# Patient Record
Sex: MALE | Race: WHITE | URBAN - METROPOLITAN AREA
[De-identification: names, ages, dates, MRNs, and addresses within clinical notes are randomized per-mention and may not be internally consistent; named-entity substitution may affect disease eponyms.]

---

## 2018-05-01 ENCOUNTER — APPOINTMENT (OUTPATIENT)
Dept: CT IMAGING | Facility: CLINIC | Age: 73
End: 2018-05-01
Attending: EMERGENCY MEDICINE
Payer: MEDICARE

## 2018-05-01 ENCOUNTER — HOSPITAL ENCOUNTER (OUTPATIENT)
Facility: CLINIC | Age: 73
Setting detail: OBSERVATION
Discharge: HOME | End: 2018-05-02
Attending: EMERGENCY MEDICINE | Admitting: INTERNAL MEDICINE
Payer: MEDICARE

## 2018-05-01 DIAGNOSIS — R03.0 ELEVATED BLOOD PRESSURE READING WITHOUT DIAGNOSIS OF HYPERTENSION: ICD-10-CM

## 2018-05-01 DIAGNOSIS — G45.9 TRANSIENT CEREBRAL ISCHEMIA, UNSPECIFIED TYPE: ICD-10-CM

## 2018-05-01 LAB
ANION GAP SERPL CALCULATED.3IONS-SCNC: 10 MMOL/L (ref 3–14)
BASOPHILS # BLD AUTO: 0 10E9/L (ref 0–0.2)
BASOPHILS NFR BLD AUTO: 0.3 %
BUN SERPL-MCNC: 14 MG/DL (ref 7–30)
CALCIUM SERPL-MCNC: 8.6 MG/DL (ref 8.5–10.1)
CHLORIDE SERPL-SCNC: 104 MMOL/L (ref 94–109)
CO2 SERPL-SCNC: 25 MMOL/L (ref 20–32)
CREAT BLD-MCNC: 1.1 MG/DL (ref 0.66–1.25)
CREAT SERPL-MCNC: 1.03 MG/DL (ref 0.66–1.25)
DIFFERENTIAL METHOD BLD: ABNORMAL
EOSINOPHIL # BLD AUTO: 0.1 10E9/L (ref 0–0.7)
EOSINOPHIL NFR BLD AUTO: 1.2 %
ERYTHROCYTE [DISTWIDTH] IN BLOOD BY AUTOMATED COUNT: 12.1 % (ref 10–15)
GFR SERPL CREATININE-BSD FRML MDRD: 66 ML/MIN/1.7M2
GFR SERPL CREATININE-BSD FRML MDRD: 71 ML/MIN/1.7M2
GLUCOSE SERPL-MCNC: 97 MG/DL (ref 70–99)
HCT VFR BLD AUTO: 40.7 % (ref 40–53)
HGB BLD-MCNC: 14.6 G/DL (ref 13.3–17.7)
IMM GRANULOCYTES # BLD: 0 10E9/L (ref 0–0.4)
IMM GRANULOCYTES NFR BLD: 0.3 %
INR PPP: 1.07 (ref 0.86–1.14)
INTERPRETATION ECG - MUSE: NORMAL
LYMPHOCYTES # BLD AUTO: 3.4 10E9/L (ref 0.8–5.3)
LYMPHOCYTES NFR BLD AUTO: 29.3 %
MCH RBC QN AUTO: 34.4 PG (ref 26.5–33)
MCHC RBC AUTO-ENTMCNC: 35.9 G/DL (ref 31.5–36.5)
MCV RBC AUTO: 96 FL (ref 78–100)
MONOCYTES # BLD AUTO: 0.8 10E9/L (ref 0–1.3)
MONOCYTES NFR BLD AUTO: 7.2 %
NEUTROPHILS # BLD AUTO: 7.1 10E9/L (ref 1.6–8.3)
NEUTROPHILS NFR BLD AUTO: 61.7 %
NRBC # BLD AUTO: 0 10*3/UL
NRBC BLD AUTO-RTO: 0 /100
PLATELET # BLD AUTO: 264 10E9/L (ref 150–450)
POTASSIUM SERPL-SCNC: 3.7 MMOL/L (ref 3.4–5.3)
RBC # BLD AUTO: 4.25 10E12/L (ref 4.4–5.9)
SODIUM SERPL-SCNC: 139 MMOL/L (ref 133–144)
TROPONIN I SERPL-MCNC: <0.015 UG/L (ref 0–0.04)
WBC # BLD AUTO: 11.5 10E9/L (ref 4–11)

## 2018-05-01 PROCEDURE — 85610 PROTHROMBIN TIME: CPT | Performed by: EMERGENCY MEDICINE

## 2018-05-01 PROCEDURE — 99220 ZZC INITIAL OBSERVATION CARE,LEVL III: CPT | Performed by: INTERNAL MEDICINE

## 2018-05-01 PROCEDURE — 70450 CT HEAD/BRAIN W/O DYE: CPT | Mod: XS

## 2018-05-01 PROCEDURE — 84484 ASSAY OF TROPONIN QUANT: CPT | Performed by: EMERGENCY MEDICINE

## 2018-05-01 PROCEDURE — 82565 ASSAY OF CREATININE: CPT | Mod: 91

## 2018-05-01 PROCEDURE — 70496 CT ANGIOGRAPHY HEAD: CPT

## 2018-05-01 PROCEDURE — 93005 ELECTROCARDIOGRAM TRACING: CPT

## 2018-05-01 PROCEDURE — G0378 HOSPITAL OBSERVATION PER HR: HCPCS

## 2018-05-01 PROCEDURE — 99285 EMERGENCY DEPT VISIT HI MDM: CPT | Mod: 25

## 2018-05-01 PROCEDURE — 80048 BASIC METABOLIC PNL TOTAL CA: CPT | Performed by: EMERGENCY MEDICINE

## 2018-05-01 PROCEDURE — 25000125 ZZHC RX 250: Performed by: EMERGENCY MEDICINE

## 2018-05-01 PROCEDURE — 85025 COMPLETE CBC W/AUTO DIFF WBC: CPT | Performed by: EMERGENCY MEDICINE

## 2018-05-01 PROCEDURE — 25000128 H RX IP 250 OP 636: Performed by: EMERGENCY MEDICINE

## 2018-05-01 RX ORDER — AMOXICILLIN 250 MG
1 CAPSULE ORAL 2 TIMES DAILY PRN
Status: DISCONTINUED | OUTPATIENT
Start: 2018-05-01 | End: 2018-05-02 | Stop reason: HOSPADM

## 2018-05-01 RX ORDER — AMOXICILLIN 250 MG
2 CAPSULE ORAL 2 TIMES DAILY PRN
Status: DISCONTINUED | OUTPATIENT
Start: 2018-05-01 | End: 2018-05-02 | Stop reason: HOSPADM

## 2018-05-01 RX ORDER — LABETALOL HYDROCHLORIDE 5 MG/ML
10-40 INJECTION, SOLUTION INTRAVENOUS EVERY 10 MIN PRN
Status: DISCONTINUED | OUTPATIENT
Start: 2018-05-01 | End: 2018-05-02 | Stop reason: HOSPADM

## 2018-05-01 RX ORDER — ATORVASTATIN CALCIUM 40 MG/1
40 TABLET, FILM COATED ORAL
Status: DISCONTINUED | OUTPATIENT
Start: 2018-05-02 | End: 2018-05-02 | Stop reason: HOSPADM

## 2018-05-01 RX ORDER — SODIUM CHLORIDE 9 MG/ML
INJECTION, SOLUTION INTRAVENOUS CONTINUOUS
Status: DISCONTINUED | OUTPATIENT
Start: 2018-05-02 | End: 2018-05-02

## 2018-05-01 RX ORDER — ACETAMINOPHEN 325 MG/1
650 TABLET ORAL EVERY 4 HOURS PRN
Status: DISCONTINUED | OUTPATIENT
Start: 2018-05-01 | End: 2018-05-02 | Stop reason: HOSPADM

## 2018-05-01 RX ORDER — NALOXONE HYDROCHLORIDE 0.4 MG/ML
.1-.4 INJECTION, SOLUTION INTRAMUSCULAR; INTRAVENOUS; SUBCUTANEOUS
Status: DISCONTINUED | OUTPATIENT
Start: 2018-05-01 | End: 2018-05-02 | Stop reason: HOSPADM

## 2018-05-01 RX ORDER — IOPAMIDOL 755 MG/ML
70 INJECTION, SOLUTION INTRAVASCULAR ONCE
Status: COMPLETED | OUTPATIENT
Start: 2018-05-01 | End: 2018-05-01

## 2018-05-01 RX ORDER — BISACODYL 10 MG
10 SUPPOSITORY, RECTAL RECTAL DAILY PRN
Status: DISCONTINUED | OUTPATIENT
Start: 2018-05-01 | End: 2018-05-02 | Stop reason: HOSPADM

## 2018-05-01 RX ORDER — HYDRALAZINE HYDROCHLORIDE 20 MG/ML
10-20 INJECTION INTRAMUSCULAR; INTRAVENOUS
Status: DISCONTINUED | OUTPATIENT
Start: 2018-05-01 | End: 2018-05-02 | Stop reason: HOSPADM

## 2018-05-01 RX ORDER — POLYETHYLENE GLYCOL 3350 17 G/17G
17 POWDER, FOR SOLUTION ORAL DAILY PRN
Status: DISCONTINUED | OUTPATIENT
Start: 2018-05-01 | End: 2018-05-02 | Stop reason: HOSPADM

## 2018-05-01 RX ORDER — PROCHLORPERAZINE 25 MG
12.5 SUPPOSITORY, RECTAL RECTAL EVERY 12 HOURS PRN
Status: DISCONTINUED | OUTPATIENT
Start: 2018-05-01 | End: 2018-05-02 | Stop reason: HOSPADM

## 2018-05-01 RX ORDER — ACETAMINOPHEN 650 MG/1
650 SUPPOSITORY RECTAL EVERY 4 HOURS PRN
Status: DISCONTINUED | OUTPATIENT
Start: 2018-05-01 | End: 2018-05-02 | Stop reason: HOSPADM

## 2018-05-01 RX ORDER — PROCHLORPERAZINE MALEATE 5 MG
5 TABLET ORAL EVERY 6 HOURS PRN
Status: DISCONTINUED | OUTPATIENT
Start: 2018-05-01 | End: 2018-05-02 | Stop reason: HOSPADM

## 2018-05-01 RX ORDER — LIDOCAINE 40 MG/G
CREAM TOPICAL
Status: DISCONTINUED | OUTPATIENT
Start: 2018-05-01 | End: 2018-05-02 | Stop reason: HOSPADM

## 2018-05-01 RX ORDER — ONDANSETRON 2 MG/ML
4 INJECTION INTRAMUSCULAR; INTRAVENOUS EVERY 6 HOURS PRN
Status: DISCONTINUED | OUTPATIENT
Start: 2018-05-01 | End: 2018-05-02 | Stop reason: HOSPADM

## 2018-05-01 RX ORDER — ASPIRIN 300 MG/1
300 SUPPOSITORY RECTAL DAILY
Status: DISCONTINUED | OUTPATIENT
Start: 2018-05-02 | End: 2018-05-02

## 2018-05-01 RX ORDER — ONDANSETRON 4 MG/1
4 TABLET, ORALLY DISINTEGRATING ORAL EVERY 6 HOURS PRN
Status: DISCONTINUED | OUTPATIENT
Start: 2018-05-01 | End: 2018-05-02 | Stop reason: HOSPADM

## 2018-05-01 RX ADMIN — IOPAMIDOL 70 ML: 755 INJECTION, SOLUTION INTRAVENOUS at 20:48

## 2018-05-01 RX ADMIN — SODIUM CHLORIDE 90 ML: 9 INJECTION, SOLUTION INTRAVENOUS at 20:49

## 2018-05-01 ASSESSMENT — ENCOUNTER SYMPTOMS
SPEECH DIFFICULTY: 1
NECK PAIN: 0
WEAKNESS: 0
HEADACHES: 1

## 2018-05-01 ASSESSMENT — VISUAL ACUITY: OU: NORMAL ACUITY

## 2018-05-01 NOTE — IP AVS SNAPSHOT
[address], [address]    [facility] [address]    Phone:  [phone]                                       After Visit Summary   5/1/2018    Bucky Cobb    MRN: [id number]           After Visit Summary Signature Page     I have received my discharge instructions, and my questions have been answered. I have discussed any challenges I see with this plan with the nurse or doctor.    ..........................................................................................................................................  Patient/Patient Representative Signature      ..........................................................................................................................................  Patient Representative Print Name and Relationship to Patient    ..................................................               ................................................  Date                                            Time    ..........................................................................................................................................  Reviewed by Signature/Title    ...................................................              ..............................................  Date                                                            Time

## 2018-05-01 NOTE — IP AVS SNAPSHOT
MRN:[id number]                      After Visit Summary   5/1/2018    Bucky Cobb    MRN: [id number]           Thank you!     Thank you for choosing [facility] for your care. Our goal is always to provide you with excellent care. Hearing back from our patients is one way we can continue to improve our services. Please take a few minutes to complete the written survey that you may receive in the mail after you visit with us. Thank you!        Patient Information     Date Of Birth          1945        Designated Caregiver       Most Recent Value    Caregiver    Will someone help with your care after discharge? yes    Name of designated caregiver nataliia     Phone number of caregiver [phone]    Caregiver address [address]      About your hospital stay     You were admitted on:  May 1, 2018 You last received care in the:  [facility] [facility] [address] [facility]    You were discharged on:  May 2, 2018        Reason for your hospital stay       TIA                  Who to Call     For medical emergencies, please call 911.  For non-urgent questions about your medical care, please call your primary care provider or clinic, None          Attending Provider     Provider Specialty    Cate Michelle MD Emergency Medicine    Lozano, Lane Gomez MD Internal Medicine       Primary Care Provider Fax #    Physician No Ref-Primary [phone]      After Care Instructions     Activity       Your activity upon discharge: activity as tolerated            Diet       Follow this diet upon discharge: Orders Placed This Encounter      Regular Diet Adult                  Follow-up Appointments     Follow-up and recommended labs and tests        Follow up with primary care provider, Physician No Ref-Primary, within 7-10 days to evaluate medication change and for hospital follow- up.  No follow up labs or test are needed.      Follow up with neurology in 4 weeks.                  Future tests that  "were ordered for you     Zio Patch Holter                 Pending Results     No orders found for last 3 day(s).            Statement of Approval     Ordered          [id number]  I have reviewed and agree with all the recommendations and orders detailed in this document.  EFFECTIVE NOW     Approved and electronically signed by:  Margaret Pisano MD             Admission Information     Date & Time Provider Department Dept. Phone    2018 Lozano, Lane Gomez MD [place] [address] [facility] [phone]      Your Vitals Were     Blood Pressure Pulse Temperature Respirations Height Weight    133/74 (BP Location: Right arm) 71 97.4  F (36.3  C) (Oral) 16 1.753 m (5' 9\") 87.5 kg (193 lb)    Pulse Oximetry BMI (Body Mass Index)                99% 28.5 kg/m2          MyChart Information     [facility] lets you send messages to your doctor, view your test results, renew your prescriptions, schedule appointments and more. To sign up, go to [email or web address] . Click on \"Log in\" on the left side of the screen, which will take you to the Welcome page. Then click on \"Sign up Now\" on the right side of the page.     You will be asked to enter the access code listed below, as well as some personal information. Please follow the directions to create your username and password.     Your access code is: [email or web address]  Expires: 2018  5:19 PM     Your access code will  in 90 days. If you need help or a new code, please call your [facility] clinic or [phone].        Care EveryWhere ID     This is your Care EveryWhere ID. This could be used by other organizations to access your [facility] medical records  [id number]        Equal Access to Services     [facility] AH: Hadii darling Tang, waangelada luneshaadaha, qaybta kaalmada adiel, sahil wilkes. So [facility] [phone].    ATENCIÓN: Si habla español, tiene a cordero disposición servicios gratuitos de asistencia lingüística. Llame al " [phone].    We comply with applicable federal civil rights laws and [place] laws. We do not discriminate on the basis of race, color, national origin, age, disability, sex, sexual orientation, or gender identity.               Review of your medicines      START taking        Dose / Directions    aspirin 81 MG EC tablet   Used for:  Transient cerebral ischemia, unspecified type        Dose:  81 mg   Start taking on:  5/3/2018   Take 1 tablet (81 mg) by mouth daily   Quantity:  30 tablet   Refills:  3       atorvastatin 40 MG tablet   Commonly known as:  LIPITOR   Used for:  Transient cerebral ischemia, unspecified type        Dose:  40 mg   1 tablet (40 mg) by Oral or NG Tube route daily   Quantity:  30 tablet   Refills:  0            Where to get your medicines      These medications were sent to [facility] - [address]  [address], [place] [address]     Phone:  [phone]     aspirin 81 MG EC tablet    atorvastatin 40 MG tablet                Protect others around you: Learn how to safely use, store and throw away your medicines at www.disposemymeds.org.             Medication List: This is a list of all your medications and when to take them. Check marks below indicate your daily home schedule. Keep this list as a reference.      Medications           Morning Afternoon Evening Bedtime As Needed    aspirin 81 MG EC tablet   Take 1 tablet (81 mg) by mouth daily   Start taking on:  5/3/2018   Last time this was given:  325 mg on 5/2/2018  8:04 AM                                   atorvastatin 40 MG tablet   Commonly known as:  LIPITOR   1 tablet (40 mg) by Oral or NG Tube route daily   Next Dose Due:  5/2/18 6:00 PM

## 2018-05-02 ENCOUNTER — APPOINTMENT (OUTPATIENT)
Dept: MRI IMAGING | Facility: CLINIC | Age: 73
End: 2018-05-02
Payer: MEDICARE

## 2018-05-02 ENCOUNTER — APPOINTMENT (OUTPATIENT)
Dept: MRI IMAGING | Facility: CLINIC | Age: 73
End: 2018-05-02
Attending: INTERNAL MEDICINE
Payer: MEDICARE

## 2018-05-02 ENCOUNTER — APPOINTMENT (OUTPATIENT)
Dept: CARDIOLOGY | Facility: CLINIC | Age: 73
End: 2018-05-02
Attending: INTERNAL MEDICINE
Payer: MEDICARE

## 2018-05-02 VITALS
SYSTOLIC BLOOD PRESSURE: 133 MMHG | BODY MASS INDEX: 28.58 KG/M2 | HEART RATE: 71 BPM | HEIGHT: 69 IN | RESPIRATION RATE: 16 BRPM | DIASTOLIC BLOOD PRESSURE: 74 MMHG | OXYGEN SATURATION: 99 % | TEMPERATURE: 97.4 F | WEIGHT: 193 LBS

## 2018-05-02 LAB
ALBUMIN SERPL-MCNC: 3.7 G/DL (ref 3.4–5)
ALP SERPL-CCNC: 53 U/L (ref 40–150)
ALT SERPL W P-5'-P-CCNC: 20 U/L (ref 0–70)
AST SERPL W P-5'-P-CCNC: 11 U/L (ref 0–45)
BILIRUB DIRECT SERPL-MCNC: 0.1 MG/DL (ref 0–0.2)
BILIRUB SERPL-MCNC: 0.6 MG/DL (ref 0.2–1.3)
CHOLEST SERPL-MCNC: 177 MG/DL
GLUCOSE BLDC GLUCOMTR-MCNC: 93 MG/DL (ref 70–99)
HBA1C MFR BLD: 5.6 % (ref 0–6.4)
HDLC SERPL-MCNC: 63 MG/DL
LDLC SERPL CALC-MCNC: 91 MG/DL
NONHDLC SERPL-MCNC: 114 MG/DL
PROT SERPL-MCNC: 7.6 G/DL (ref 6.8–8.8)
TRIGL SERPL-MCNC: 114 MG/DL
TROPONIN I SERPL-MCNC: <0.015 UG/L (ref 0–0.04)
TROPONIN I SERPL-MCNC: <0.015 UG/L (ref 0–0.04)
TSH SERPL DL<=0.005 MIU/L-ACNC: 2.74 MU/L (ref 0.4–4)

## 2018-05-02 PROCEDURE — 99217 ZZC OBSERVATION CARE DISCHARGE: CPT | Performed by: HOSPITALIST

## 2018-05-02 PROCEDURE — 95816 EEG AWAKE AND DROWSY: CPT

## 2018-05-02 PROCEDURE — 84443 ASSAY THYROID STIM HORMONE: CPT | Performed by: INTERNAL MEDICINE

## 2018-05-02 PROCEDURE — 83036 HEMOGLOBIN GLYCOSYLATED A1C: CPT | Performed by: INTERNAL MEDICINE

## 2018-05-02 PROCEDURE — 25000128 H RX IP 250 OP 636: Performed by: INTERNAL MEDICINE

## 2018-05-02 PROCEDURE — 70553 MRI BRAIN STEM W/O & W/DYE: CPT | Mod: 77

## 2018-05-02 PROCEDURE — 80076 HEPATIC FUNCTION PANEL: CPT | Performed by: INTERNAL MEDICINE

## 2018-05-02 PROCEDURE — A9270 NON-COVERED ITEM OR SERVICE: HCPCS | Mod: GY | Performed by: INTERNAL MEDICINE

## 2018-05-02 PROCEDURE — 93306 TTE W/DOPPLER COMPLETE: CPT

## 2018-05-02 PROCEDURE — G0378 HOSPITAL OBSERVATION PER HR: HCPCS

## 2018-05-02 PROCEDURE — 80061 LIPID PANEL: CPT | Performed by: INTERNAL MEDICINE

## 2018-05-02 PROCEDURE — 93306 TTE W/DOPPLER COMPLETE: CPT | Mod: 26 | Performed by: INTERNAL MEDICINE

## 2018-05-02 PROCEDURE — A9585 GADOBUTROL INJECTION: HCPCS | Performed by: INTERNAL MEDICINE

## 2018-05-02 PROCEDURE — 40000061 ZZH STATISTIC EEG TIME EA 10 MIN

## 2018-05-02 PROCEDURE — 70553 MRI BRAIN STEM W/O & W/DYE: CPT

## 2018-05-02 PROCEDURE — 84484 ASSAY OF TROPONIN QUANT: CPT | Performed by: INTERNAL MEDICINE

## 2018-05-02 PROCEDURE — 25000132 ZZH RX MED GY IP 250 OP 250 PS 637: Mod: GY | Performed by: INTERNAL MEDICINE

## 2018-05-02 PROCEDURE — 36415 COLL VENOUS BLD VENIPUNCTURE: CPT | Performed by: INTERNAL MEDICINE

## 2018-05-02 PROCEDURE — 70546 MR ANGIOGRAPH HEAD W/O&W/DYE: CPT | Mod: XS

## 2018-05-02 PROCEDURE — 00000146 ZZHCL STATISTIC GLUCOSE BY METER IP

## 2018-05-02 RX ORDER — GADOBUTROL 604.72 MG/ML
10 INJECTION INTRAVENOUS ONCE
Status: COMPLETED | OUTPATIENT
Start: 2018-05-02 | End: 2018-05-02

## 2018-05-02 RX ORDER — ATORVASTATIN CALCIUM 40 MG/1
40 TABLET, FILM COATED ORAL DAILY
Qty: 30 TABLET | Refills: 0 | Status: SHIPPED | OUTPATIENT
Start: 2018-05-02

## 2018-05-02 RX ORDER — ASPIRIN 81 MG/1
81 TABLET ORAL DAILY
Status: DISCONTINUED | OUTPATIENT
Start: 2018-05-03 | End: 2018-05-02 | Stop reason: HOSPADM

## 2018-05-02 RX ORDER — GADOBUTROL 604.72 MG/ML
9 INJECTION INTRAVENOUS ONCE
Status: COMPLETED | OUTPATIENT
Start: 2018-05-02 | End: 2018-05-02

## 2018-05-02 RX ORDER — ASPIRIN 300 MG/1
300 SUPPOSITORY RECTAL DAILY
Status: DISCONTINUED | OUTPATIENT
Start: 2018-05-03 | End: 2018-05-02 | Stop reason: HOSPADM

## 2018-05-02 RX ADMIN — ASPIRIN 325 MG: 325 TABLET, DELAYED RELEASE ORAL at 08:04

## 2018-05-02 RX ADMIN — GADOBUTROL 9 ML: 604.72 INJECTION INTRAVENOUS at 05:16

## 2018-05-02 RX ADMIN — ASPIRIN 325 MG: 325 TABLET, DELAYED RELEASE ORAL at 01:01

## 2018-05-02 RX ADMIN — SODIUM CHLORIDE, PRESERVATIVE FREE: 5 INJECTION INTRAVENOUS at 00:24

## 2018-05-02 RX ADMIN — GADOBUTROL 10 ML: 604.72 INJECTION INTRAVENOUS at 15:05

## 2018-05-02 ASSESSMENT — VISUAL ACUITY
OU: NORMAL ACUITY

## 2018-05-02 NOTE — H&P
[facility]    History and Physical  Hospitalist       Date of Admission:  5/1/2018    Assessment & Plan   Bucky Cobb is a 72 year old male who presents with transient episode of garbled speech concerning for TIA    Garbled speech: Likely transient ischemic event.  Head CT and CTA negative, MRI pending.  Approximately 10 minutes of symptoms suggestive of TIA.  No associated weakness.  Patient denies any prior medical diagnoses, does not take any medication.  Does not smoke or drink.  No family history of cardiac or cerebrovascular disease.  -Bedside swallow evaluation per nursing protocol  -Cardiac telemetry to rule out/monitor for dysrhythmia  -MRI brain pending  -TTE with bubble study  -Neurology consult for suspected TIA  -Atorvastatin 40 mg daily.  Patient with some hesitancy regarding starting statin, though agreeable to initiation for immediate anti-inflammatory benefit.  Discussed potential for discontinuing at a later date versus transition over to red rice yeast as is patient's preference.  -Daily p.o./DE aspirin  -Discussed with patient that I will likely recommend continuation of both aspirin and statin therapy at discharge.  -At this time, physical therapy, occupational therapy, and speech pathology have not been consulted.  If MRI demonstrates ischemic event, these will need to be ordered.  -Troponin, inflammatory markers, hemoglobin A1c pending    # Pain Assessment:  Current Pain Score 5/1/2018   Pain score (0-10) 2   Bucky s pain level was assessed and he currently denies pain.  Was having mild discomfort with left-sided headache in emergency department.  Acetaminophen p.o./DE is available for pain control with anticipated initiation of aspirin therapy.    DVT Prophylaxis: Pneumatic Compression Devices    Code Status: DNR. Okay with a time limited trial of intubation for reversible causes. Discussed with patient and wife on admisson.    Disposition: Expected discharge 5/2/18  pending neurology evaluation, MRI results.    [facility]    Primary Care Physician   Dr Merlin Brown    Chief Complaint   Garbled speech    History is obtained from the patient, chart review, discussion with Dr Michelle in the ED, discussion with patient's wife at bedside    History of Present Illness   Bucky Cobb is a 72 year old male who presents after an episode of garbled speech occurring at approximately 2PM, lasting approximately 10 minutes. Associated with a left sided headache. No weakness, no visual symptoms.     Patient describes ambulating at [facility] earlier today when he had onset of left-sided headache at approximately 2 PM.  States that he typically does not get headaches.  Attributed onset of headache to possible chemical exposure at [facility] (no specific exposure noted).  While explaining this to his wife, however, he noted that his speech was garbled.  Per wife and patient's description/reenactment, rearrangement of syllables with unintelligible words.  Patient's wife stated that she actually tested his  strength and look for facial droop as they got into the car, and patient had none.  Patient noted no weakness in any of his extremities associated with onset of symptoms.  Has never had similar symptoms in the past.  No prior history of migraine headaches or atypical migraines.  Patient went home, took a nap, and when he woke, symptoms had resolved.  States that he talked to his daughter, who is graduating from medical school in [place] to study neurology later this year, and she encouraged him to come to the emergency department for evaluation given fear of stroke versus TIA.  At this time, patient is asymptomatic.  Speech is clear and intelligible, no focal deficits.    Patient describes himself as otherwise quite healthy.  He takes no medications and states that he has no prior medical diagnoses.  States that his laboratory studies were evaluated in August and reportedly excellent.   States that his typical blood pressure is in the 120 systolic range, though currently is in the 160s.  States that he has been under quite a bit of stress over the past several days.  Patient's mother-in-law has dementia, and he spent 8 of the last 10 or so days driving to and from [place] to pick her up and bring her back to [place] so that she could be placed in a memory care unit.  Patient and his wife state that she can be quite disagreeable with her dementia, and this had been a high stress trip.     Patient states that he typically walks 5 or so miles per day, though has not done so for the past several months.  Describes becoming ill with the flu last summer while in [place], subsequently having a torn quadriceps leading to a fairly prolonged period of immobility.  Subsequently minimally active over the winter months given the weather.    Patient does not smoke, does not drink alcohol.    Past Medical History    I have reviewed this patient's medical history and updated it with pertinent information if needed.   History reviewed. No pertinent past medical history. Denies any medical diagnoses including HTN, HLD, diabetes. No heart disease or CVA history.    Past Surgical History   I have reviewed this patient's surgical history and updated it with pertinent information if needed.  Patient with no pertinent surgical history, though did have a quadriceps tear in August resulting in decreased mobility.    Prior to Admission Medications   None     Allergies   No Known Allergies    Social History   I have reviewed this patient's social history and updated it with pertinent information if needed. Bucky Cobb  reports that he has never smoked. He has never used smokeless tobacco. does not use illicit drugs    Family History   I have reviewed this patient's family history and updated it with pertinent information if needed.   Mother  at age 85, father  at age 95. No history of heart disease or stroke  in family  Sister drinks alcohol and smokes cigarettes, though is reportedly healthy.    Review of Systems   The 10 point Review of Systems is negative other than noted in the HPI or here.  No lower extremity swelling or pain in legs associated with prolonged car travel  No extremity weakness    Physical Exam   Temp: 98.1  F (36.7  C) Temp src: Oral BP: 157/90 Pulse: 77 Heart Rate: 74 Resp: 20 SpO2: 98 %      Vital Signs with Ranges  Temp:  [98.1  F (36.7  C)] 98.1  F (36.7  C)  Pulse:  [77] 77  Heart Rate:  [71-74] 74  Resp:  [20] 20  BP: (146-165)/(85-95) 157/90  SpO2:  [97 %-98 %] 98 %  193 lbs 0 oz    Constitutional: no acute distress, alert, conversant male laying comfortably in bed.  Eyes: no scleral icterus or injection  HEENT: moist mucous membranes.  Capillary bed collapse is noted in bilateral earlobes.  Respiratory: breath sounds clear bilaterally to auscultation, no wheezes, no crackles  Cardiovascular: regular rate and rhythm, no murmur   GI: abdomen soft, non-tender, normoactive bowel sounds, no masses  Lymph/Hematologic: no lower extremity swelling  Musculoskeletal: muscular tone intact in all extremities  Neurologic: mental status grossly intact, no focal deficits, alert  Psychiatric: normal affect, jovial.    Data   Data reviewed today:  I personally reviewed the EKG tracing showing Normal sinus rhythm.    Recent Labs  Lab 05/01/18 2007   WBC 11.5*   HGB 14.6   MCV 96      INR 1.07      POTASSIUM 3.7   CHLORIDE 104   CO2 25   BUN 14   CR 1.03   ANIONGAP 10   LIANA 8.6   GLC 97   TROPI <0.015       Recent Results (from the past 24 hour(s))   CT Head w/o Contrast    Narrative    CT SCAN OF THE HEAD WITHOUT CONTRAST   5/1/2018 9:00 PM     HISTORY: Headache and transient speech change, both resolved.    TECHNIQUE: Axial images of the head and coronal reformations without  IV contrast material. Radiation dose for this scan was reduced using  automated exposure control, adjustment of the mA  and/or kV according  to patient size, or iterative reconstruction technique.    COMPARISON: None.    FINDINGS: There is no evidence of intracranial hemorrhage, mass, or  anomaly. The ventricles are normal in size, shape and configuration.  Mild diffuse parenchymal volume loss. Mild patchy periventricular  white matter hypodensities which are nonspecific, but likely related  to chronic microvascular ischemic disease.     The visualized portions of the sinuses and mastoids appear normal. The  bony calvarium and bones of the skull base appear intact.       Impression    IMPRESSION:     1. No evidence of acute intracranial hemorrhage, mass, or herniation.  2. Mild diffuse parenchymal volume loss and white matter changes  likely due to chronic microvascular ischemic disease.      MADISON ALICEA MD   CT Head Neck Angio w/o & w Contrast    Narrative    CT ANGIOGRAM OF THE HEAD AND NECK WITH CONTRAST  5/1/2018 9:00 PM     HISTORY: Headache, transient speech change, now resolved.     TECHNIQUE: CT angiography with an injection of 70 mL Isovue -370 IV  with scans through the head and neck.  Images were transferred to a  separate 3-D workstation where multiplanar reformations and 3-D images  were created.  Estimates of carotid stenoses are made relative to the  distal internal carotid artery diameters except as noted. Radiation  dose for this scan was reduced using automated exposure control,  adjustment of the mA and/or kV according to patient size, or iterative  reconstruction technique.    COMPARISON: None.     CT HEAD FINDINGS: No contrast enhancing lesions. Cerebral blood flow  is grossly normal.     CT ANGIOGRAM HEAD FINDINGS:  The major intracranial arteries including  the proximal branches of the anterior cerebral, middle cerebral, and  posterior cerebral arteries appear patent without vascular cutoff. No  aneurysm identified. No significant stenosis.     Anatomic variant of a small or absent left P1 segment with a  patent  left posterior communicating artery.     CT ANGIOGRAM NECK FINDINGS:   Normal origin of the great vessels from the aortic arch.     Right carotid artery: The right common and internal carotid arteries  are patent. No significant stenosis.     Left carotid artery: The left common and internal carotid arteries are  patent. Mild atherosclerotic disease at the carotid bifurcation  without stenosis.     Vertebral arteries: Vertebral arteries are patent without evidence of  dissection. No significant stenosis.     Other findings: None.       Impression    IMPRESSION:   1. Patent arteries in the neck without evidence of dissection. Mild  atherosclerotic disease at the left carotid bifurcation without  significant stenosis by NASCET criteria.  2. Patent proximal major intracranial arteries without vascular  cutoff. No significant stenosis. No aneurysm identified.

## 2018-05-02 NOTE — PLAN OF CARE
Problem: Patient Care Overview  Goal: Plan of Care/Patient Progress Review  Outcome: No Change  Patient A/Ox4. VSS on RA. Patient denies pain, SOB, nausea, numbness/tingling. Neuro's intact. Passed bedside swallow screen. NIH complete. Advanced diet to regular. Up SBA. BG 93. Tele: NSR. IVF nacl 100ml/h. Voiding adequately. LUE slight tremor that comes and goes baseline per patient.Plan; MRI results pending, echo today, neuro consult today.      Observation goals PRIOR TO DISCHARGE     Comments: -diagnostic tests and consults completed and resulted  Not met  -vital signs normal or at patient baseline Met  Nurse to notify provider when observation goals have been met and patient is ready for discharge.

## 2018-05-02 NOTE — CONSULTS
Neurology Stroke Consult    Bucky Cobb  [id number]  1945    [facility]  Mr. Cobb is a 72-year-old gentleman with past medical history as described below who presented with an episode of expressive aphasia lasting for several minutes that was witnessed by his wife.  He remembers the episode and he remembers talking with his wife however he was not able to find correct words.  He denied of any other focal neurological deficits.  He did describe mild headache however otherwise he remained normal.  The event occurred in setting of him trying to lift up heavy luggage that he was trying to put the back of his car.  He denies of any similar episodes in the past.      Pertinent Past Medical/Surgical History    Active Ambulatory Problems     Diagnosis Date Noted     No Active Ambulatory Problems     Resolved Ambulatory Problems     Diagnosis Date Noted     No Resolved Ambulatory Problems     No Additional Past Medical History         Medications:   No prescriptions prior to admission.   .    Allergies: No Known Allergies.    Family History: No family history on file..    Social History:   Social History   Substance Use Topics     Smoking status: Never Smoker     Smokeless tobacco: Never Used     Alcohol use Not on file   .      ROS:  The 10 point Review of Systems is negative other than noted in the HPI or here.     PHYSICAL EXAMINATION  Vital Signs:  B/P: 111/67,  T: 97.9,  P: Data Unavailable,  R: 24    General:  patient lying in bed without any acute distress    HEENT:  normocephalic/atraumatic  Cardio:  RRR  Pulmonary:  no respiratory distress  Abdomen:  soft  Extremities:  no edema  Skin:  intact     Neurologic  Mental Status:  fully alert, attentive and oriented, follows commands, speech clear and fluent  Cranial Nerves:  visual fields intact, PERRL, EOMI with normal smooth pursuit, facial sensation intact and symmetric, facial movements symmetric, hearing not formally tested but intact to conversation, palate  elevation symmetric and uvula midline, no dysarthria, shoulder shrug strong bilaterally, tongue protrusion midline  Motor:  no abnormal movements, normal tone throughout, normal muscle bulk, no pronator drift, normal and symmetric rapid finger tapping, able to move all limbs spontaneously, strength 5/5 throughout upper and lower extremities  Reflexes:  2+ and symmetric throughout, no clonus, toes downgoing  Sensory:  intact/symmetric to light touch and pin prick throughout upper and lower extremities  Coordination:  FNF and HS intact without dysmetria  Station/Gait:  unable to test    Labs  Most Recent 3 CBC's:  Recent Labs   Lab Test  05/01/18 2007   WBC  11.5*   HGB  14.6   MCV  96   PLT  264      Most Recent 3 BMP's:  Recent Labs   Lab Test  05/01/18 2007   NA  139   POTASSIUM  3.7   CHLORIDE  104   CO2  25   BUN  14   CR  1.03   ANIONGAP  10   LIANA  8.6   GLC  97     Most Recent 2 LFT's:  Recent Labs   Lab Test  05/02/18   0025   AST  11   ALT  20   ALKPHOS  53   BILITOTAL  0.6     Most Recent INR's and Anticoagulation Dosing History:  Anticoagulation Dose History     Recent Dosing and Labs Latest Ref Rng & Units 5/1/2018    INR 0.86 - 1.14 1.07        Most Recent 3 Troponin's:  Recent Labs   Lab Test  05/02/18   0830  05/02/18   0025  05/01/18 2007   TROPI  <0.015  <0.015  <0.015     Most Recent Cholesterol Panel:  Recent Labs   Lab Test  05/02/18   0025   CHOL  177   LDL  91   HDL  63   TRIG  114     Most Recent 6 Bacteria Isolates From Any Culture (See EPIC Reports for Culture Details):No lab results found.  Most Recent TSH, T4 and A1c Labs:  Recent Labs   Lab Test  05/02/18   0025   TSH  2.74   A1C  5.6     Results for orders placed or performed during the hospital encounter of 05/01/18   CT Head w/o Contrast    Narrative    CT SCAN OF THE HEAD WITHOUT CONTRAST   5/1/2018 9:00 PM     HISTORY: Headache and transient speech change, both resolved.    TECHNIQUE: Axial images of the head and coronal  reformations without  IV contrast material. Radiation dose for this scan was reduced using  automated exposure control, adjustment of the mA and/or kV according  to patient size, or iterative reconstruction technique.    COMPARISON: None.    FINDINGS: There is no evidence of intracranial hemorrhage, mass, or  anomaly. The ventricles are normal in size, shape and configuration.  Mild diffuse parenchymal volume loss. Mild patchy periventricular  white matter hypodensities which are nonspecific, but likely related  to chronic microvascular ischemic disease.     The visualized portions of the sinuses and mastoids appear normal. The  bony calvarium and bones of the skull base appear intact.       Impression    IMPRESSION:     1. No evidence of acute intracranial hemorrhage, mass, or herniation.  2. Mild diffuse parenchymal volume loss and white matter changes  likely due to chronic microvascular ischemic disease.      MADISON ALICEA MD   CT Head Neck Angio w/o & w Contrast    Narrative    CT ANGIOGRAM OF THE HEAD AND NECK WITH CONTRAST  5/1/2018 9:00 PM     HISTORY: Headache, transient speech change, now resolved.     TECHNIQUE: CT angiography with an injection of 70 mL Isovue -370 IV  with scans through the head and neck.  Images were transferred to a  separate 3-D workstation where multiplanar reformations and 3-D images  were created.  Estimates of carotid stenoses are made relative to the  distal internal carotid artery diameters except as noted. Radiation  dose for this scan was reduced using automated exposure control,  adjustment of the mA and/or kV according to patient size, or iterative  reconstruction technique.    COMPARISON: None.     CT HEAD FINDINGS: No contrast enhancing lesions. Cerebral blood flow  is grossly normal.     CT ANGIOGRAM HEAD FINDINGS:  The major intracranial arteries including  the proximal branches of the anterior cerebral, middle cerebral, and  posterior cerebral arteries appear patent  without vascular cutoff. No  aneurysm identified. No significant stenosis.     Anatomic variant of a small or absent left P1 segment with a patent  left posterior communicating artery.     CT ANGIOGRAM NECK FINDINGS:   Normal origin of the great vessels from the aortic arch.     Right carotid artery: The right common and internal carotid arteries  are patent. No significant stenosis.     Left carotid artery: The left common and internal carotid arteries are  patent. Mild atherosclerotic disease at the carotid bifurcation  without stenosis.     Vertebral arteries: Vertebral arteries are patent without evidence of  dissection. No significant stenosis.     Other findings: None.       Impression    IMPRESSION:   1. Patent arteries in the neck without evidence of dissection. Mild  atherosclerotic disease at the left carotid bifurcation without  significant stenosis by NASCET criteria.  2. Patent proximal major intracranial arteries without vascular  cutoff. No significant stenosis. No aneurysm identified.       MADISON ALICEA MD   MRI Brain w & w/o contrast    Narrative    MRI OF THE BRAIN WITHOUT AND WITH CONTRAST 5/2/2018 5:17 AM     COMPARISON: Head CT 5/1/2018.    HISTORY:  Stroke with SWI Sequence.     TECHNIQUE: Axial diffusion-weighted with ADC map, axial T2-weighted  with fat saturation, axial T1-weighted, axial turboFLAIR and coronal  T1-weighted images of the brain were acquired without intravenous  contrast.  Following intravenous administration of gadolinium (9 mL  Gadavist), axial T1-weighted images of the brain were acquired.     FINDINGS: There is mild diffuse cerebral volume loss. There are a few  tiny scattered focal areas of abnormal T2 signal hyperintensity in the  cerebral white matter bilaterally that are consistent with sequela of  chronic small vessel ischemic disease.     The ventricles and basal cisterns are within normal limits in  configuration given the degree of cerebral volume loss.  There is  no  midline shift.  There are no extra-axial fluid collections.  There is  no evidence for stroke or acute intracranial hemorrhage.  There is no  abnormal contrast enhancement in the brain or its coverings.     There is no sinusitis or mastoiditis.      Impression    IMPRESSION:  Diffuse cerebral volume loss and cerebral white matter  changes consistent with chronic small vessel ischemic disease. No  evidence for acute intracranial pathology.         ASSESSMENT & RECOMMENDATIONS     Mr. Alanis is a 72-year-old gentleman presented with episode of transient expressive aphasia which likely was TIA.  His MRI does not show any evidence of acute infarction, and is CTA of head and neck did not show any significant stenosis of intra-or extracranial blood vessels.  Transthoracic echocardiogram is within normal limits, no PFO, normal size atria, and no other cardiac source of emboli identified.    There is mild focal narrowing of superior sagittal sinus in the middle third that is seen on venous phase of CTA.  Corresponding location on MRI does not show hyperintense signal on T1 sequences, however parasagittal meningioma cannot be ruled out. He denies of any continuous headaches.    Plan:  -Aspirin 81 mg daily  -Lipitor 20 mg daily  -Repeat MRI with contrast with MRV, will follow the results  -CardioNet heart monitor at discharge  -EEG bedside    Plan discussed with Dr. Gely Belcher  fellow

## 2018-05-02 NOTE — ED NOTES
"[facility]  ED Nurse Handoff Report    ED Chief complaint: Slurred Speech (patient here with slurred speech at 2 pm with a headache. speech is back to normal but he still has a headache)      ED Diagnosis:   Final diagnoses:   Transient cerebral ischemia, unspecified type   Elevated blood pressure reading without diagnosis of hypertension       Code Status: Full Code    Allergies: No Known Allergies    Activity level - Baseline/Home:  Independent    Activity Level - Current:   Independent     Needed?: No    Isolation: No  Infection: Not Applicable    Bariatric?: No    Vital Signs:   Vitals:    05/01/18 1954 05/01/18 2042 05/01/18 2145 05/01/18 2200   BP: (!) 164/95 (!) 165/93 146/85 157/90   Pulse: 77      Resp: 20      Temp: 98.1  F (36.7  C)      TempSrc: Oral      SpO2: 97%  97% 98%   Weight: 87.5 kg (193 lb)      Height: 1.753 m (5' 9\")          Cardiac Rhythm: ,        Pain level: 0-10 Pain Scale: 2    Is this patient confused?: No     Patient Report: Initial Complaint: Pt presents to the ED after having slurred speech for approx 10-20 minutes this afternoon that resolved after taking a nap.  Focused Assessment: Pt is neurologically intact. Mildly elevated BPs.  Tests Performed: labs and head CT and CT angio head/neck.  Abnormal Results: none  Treatments provided: none    Family Comments: Wife at bedside. Very supportive. Pt's daughter is a neurologist.    OBS brochure/video discussed/provided to patient: N/A    ED Medications:   Medications   Saline Flush - CT (90 mLs Intravenous Given 5/1/18 2049)   iopamidol (ISOVUE-370) solution 70 mL (70 mLs Intravenous Given 5/1/18 2048)       Drips infusing?:  No    For the majority of the shift this patient was Green.   Interventions performed were none.    Severe Sepsis OR Septic Shock Diagnosis Present: No      ED NURSE PHONE NUMBER: [phone]           "

## 2018-05-02 NOTE — PROGRESS NOTES
RECEIVING UNIT ED HANDOFF REVIEW    ED Nurse Handoff Report was reviewed by: Jace Valero on May 1, 2018 at 11:25 PM

## 2018-05-02 NOTE — PHARMACY-ADMISSION MEDICATION HISTORY
Admission medication history interview status for the 5/1/2018  admission is complete. See EPIC admission navigator for prior to admission medications     Medication history source reliability:Good    Actions taken by pharmacist (provider contacted, etc):  Spoke w/ patient.      Additional medication history information not noted on PTA med list :   - Patient states that he is not regularly taking any prescription or OTC medications at this time.     Medication reconciliation/reorder completed by provider prior to medication history? No    Time spent in this activity: 5 minutes    Valerie Collado, TcD, BCPS

## 2018-05-02 NOTE — PROGRESS NOTES
EEG CLINICAL NEUROPHYSIOLOGY PRELIMINARY REPORT    EEG performed earlier today reviewed. Normal study, mostly during wakefulness. No focal slowing, epileptiform activity, or electrographic seizures. Full report to follow.    Karthik Gramajo MD  Pager [phone]

## 2018-05-02 NOTE — PLAN OF CARE
Problem: Patient Care Overview  Goal: Plan of Care/Patient Progress Review  Pt a/o x 4. VSS. Tele was NSR. Denies pain. Neuros stable, has UE tremors at baseline off and on. Tolerating regular diet, up independently. Discharge instructions given, all questions answered. Discharge medications given, all belongings sent with pt. Pt set to d/c home with wife. Wife set to pick pt up at  stand, Rn walked pt down.

## 2018-05-02 NOTE — ED PROVIDER NOTES
"  History     Chief Complaint:  Slurred Speech       The history is provided by the patient.      Bucky Cobb is a 72 year old male who presents to the ED for evaluation of slurred speech. Today at 1400, the patient had an episode of slurred speech that lasted about 10 minutes. He developed a headache just prior to this as well. He describes the speech change as jumbling words and mixing up syllables.  Witnessed by his wife.  He took a nap and felt better, but continued to have a mild headache by the left eye. After a talk with his daughter, who is in neurology training, she encouraged him to come in for evaluation.  He denies chest pain, neck pain, one sided weakness, a history of DM, HLD, or cardiac disease or a family history of stroke.  Of note, he has not eaten since this morning.     Reports last physical done at the medical building next door to the ED, in August 2017.    Allergies:  NKDA     Medications:    The patient is not currently taking any prescribed medications.    Past Medical History:    The patient denies any significant past medical history.    Past Surgical History:    The patient does not have any pertinent past surgical history.    Family History:    No past pertinent family history.    Social History:  Presents with his wife.   Negative for tobacco use.  Negative for smokeless tobacco.   Marital Status:   [2]     Review of Systems   Cardiovascular: Negative for chest pain.   Musculoskeletal: Negative for neck pain.   Neurological: Positive for speech difficulty (resolved) and headaches. Negative for weakness.   All other systems reviewed and are negative.    Physical Exam   First Vitals:  BP: (!) 164/95  Pulse: 77  Temp: 98.1  F (36.7  C)  Resp: 20  Height: 175.3 cm (5' 9\")  Weight: 87.5 kg (193 lb)  SpO2: 97 %      Physical Exam  General: Cooperative, appears comfortable  Head:  The scalp, face, and head appear normal and symmetric  Eyes:  Sclera white; PERRL; EOMI  ENT:  "   External ears and nares normal  Neck:  No meningismus or bruit  CV:  Rate as above with regular rhythm   Resp:  Breath sounds clear and equal bilaterally  GI:  Abdomen is soft, non-tender, non-distended  MS:  Moves all extremities  Neuro:  Speech is normal and fluent. No apparent deficit    Strength 5/5 x4.  Sensation intact x4.      Cranial nerves II-XII intact by examination.    Normal gait    NIHSS baseline: 0    Emergency Department Course   ECG:  Indication: Slurred speech  Time: 2123  Vent. Rate 73 bpm. MO interval 188. QRS duration 84. QT/QTc 370/407. P-R-T axis 56 41 27. Normal sinus rhythm. Normal ECG. Read time: 2217.    Imaging:  Radiographic findings were communicated with the patient who voiced understanding of the findings.    CT Head without contrast:   IMPRESSION:     1. No evidence of acute intracranial hemorrhage, mass, or herniation.  2. Mild diffuse parenchymal volume loss and white matter changes  likely due to chronic microvascular ischemic disease. As per radiology.    CT Head Neck Angio with and without contrast:   IMPRESSION:   1. Patent arteries in the neck without evidence of dissection. Mild  atherosclerotic disease at the left carotid bifurcation without  significant stenosis by NASCET criteria.  2. Patent proximal major intracranial arteries without vascular  cutoff. No significant stenosis. No aneurysm identified.  As per radiology.      Laboratory:  2036 Creatinine POCT: 1.1 GFR 66  2007 Troponin: <0.015    INR: 1.07    CBC: WBC: 11.5 (H), HGB: 14.6, PLT: 264  BMP: Glucose 97, o/w WNL (Creatinine: 1.03)    Emergency Department Course:  Nursing notes and vitals reviewed. 2021 I performed an exam of the patient as documented above. EKG obtained in the ED, see results above.     Blood drawn. This was sent to the lab for further testing, results above.    The patient was sent for a CT/CTA while in the emergency department, findings above.     2203 I rechecked the patient and discussed  the results of his workup thus far.     2217  I consulted with Dr. Valdez of the hospitalist services. They are in agreement to accept the patient for admission.    2221 I rechecked the patient and discussed the results of his workup thus far.     Findings and plan explained to the Patient who consents to admission. Discussed the patient with Dr. Valdez, who will admit the patient to a obs bed for further monitoring, evaluation, and treatment.    Impression & Plan    Medical Decision Making:  Bucky Cobb is a 72 year old male is here for evaluation of a transient episode of garbled speech.  This is consistent with TIA.  Differential includes stroke, hemorrhage, dissection, embolic phenomena, and intracranial mass.  Blood pressure on arrival is elevated with no history of high blood pressure. He has not eaten for a while today and transient hypoglycemia was considered but should not have improved without eating and no hypoglycemia was noted on his labs.  EKG shows no evidence of a contributing dysrhythmia.  Workup with CT and CTA are without abnormalities.   ABCD 2 score is 3.  Given the patient's ABCD 2 score and associated risk for stroke in the next several days, he will be brought in to the hospital with plan for MRI and echo.    Diagnosis:    ICD-10-CM   1. Transient cerebral ischemia, unspecified type G45.9   2. Elevated blood pressure reading without diagnosis of hypertension R03.0       Disposition:  Admitted to Dr. Valdez     I, Susan Sanchez, am serving as a scribe on 5/1/2018 at 8:21 PM to personally document services performed by Cate Michelle, * based on my observations and the provider's statements to me.       Susan Sanchez  5/1/2018    EMERGENCY DEPARTMENT       Cate Michelle MD  05/02/18 0001

## 2018-05-02 NOTE — PROCEDURES
Procedure Date: 2018      PORTABLE INPATIENT ELECTROENCEPHALOGRAM         ORDERING PROVIDER:  Gibran Belcher MD        EEG #:       DATE OF RECORDIN2018      HISTORY:  Portable EEG performed on Bucky Agarwal, a 72-year-old who experienced an episode of expressive aphasia.  Concern is raised that the spell may have been an epileptic seizure and he is being evaluated for epilepsy.  He is not currently being treated with anticonvulsant medications.      FINDINGS:  The EEG is performed during wakefulness and brief drowsiness.  During some portions of the record, an 8-9 Hz posterior dominant rhythm is seen that is symmetrical and reactive.  EEG is de-synchronized during the majority of the waking record.  With drowsiness, there is fast activity in the central regions and some more irregular theta.  Deeper sleep is not seen at any point.  There is no clear focal slowing.        Hyperventilation and photic stimulation are not performed.      INTERICTAL ABNORMALITIES:  No epileptiform discharges.      ICTAL ABNORMALITIES:  No electrographic seizures.      IMPRESSION:  Normal, mostly in wakefulness.  No epileptiform activity or seizures.         ANDREWS LARSON MD             D: 2018   T: 2018   MT: BELEN      Name:     BUCKY AGARWAL   MRN:      [id number]        Account:        [id number]   :      1945           Procedure Date: 2018      Document: [id number]

## 2018-05-02 NOTE — PLAN OF CARE
Problem: Patient Care Overview  Goal: Plan of Care/Patient Progress Review  Discharge goals:  Diagnostic tests and consults completed and resulted - goal not met, waiting for repeat MRI  Vital signs normal or at patient baseline - goal met

## 2018-05-02 NOTE — PLAN OF CARE
Problem: Patient Care Overview  Goal: Plan of Care/Patient Progress Review  Discharge goals:  Diagnostic tests and consults completed and resulted - goal not met, waiting for neuro consult  Vital signs normal or at patient baseline - goal met

## 2018-05-03 NOTE — DISCHARGE SUMMARY
Admit Date:     05/01/2018   Discharge Date:     05/02/2018      PRIMARY CARE PROVIDER:  None.        DISCHARGE DIAGNOSIS: Suspected transient ischemic attack.      CONSULTATIONS OBTAINED DURING HOSPITAL STAY:  Neurology consult.      SIGNIFICANT TESTS AND RESULTS:  Included normal CBC, BMP, negative troponin, normal TSH.  Blood sugar and hemoglobin A1c normal.  CT angiogram of head and neck without any occlusion or stenosis.  CT head without contrast was negative.  MRI brain without contrast showed diffuse cerebral volume loss and cerebral white matter changes, consistent with chronic small vessel ischemic disease, no evidence of acute intracranial pathology.  MR venogram was negative for any thrombus.  Deep veins are patent.      HOSPITAL COURSE:  The patient is a 72-year-old pleasant male without any medical problems, who presented to the ER due to garbled speech that happened while he was outside in a store.  He had symptoms for 10 minutes associated with a mild headache.  The patient had above-mentioned workup that was negative.  He was monitored on telemetry and workup including above imaging studies, as well as transthoracic echo with bubble study was done, which was negative.  He was started on aspirin and statin. No deficit was noted clinically on exam, and MRI was negative and so no therapy was ordered.  Given this, the patient will be discharged home today.  Neurology recommended CardioNet heart monitor at discharge, ordered.  He also had EEG which was negative.      PHYSICAL EXAMINATION ON DAY OF DISCHARGE:   GENERAL:  The patient is alert, awake, oriented, does not appear to be in distress.   VITAL SIGNS:  Blood pressure is 133/74, heart rate 66, temperature 97.4, respirations 16, pulse ox 99 on room air.   HEENT:  PERRLA, EOMI.  Oral mucosa moist.   NECK:  Supple.   LUNGS:  Bilateral equal air entry, clear to auscultation.  Normal work of breathing.   CARDIOVASCULAR:  S1, S2, regular, no murmur, rub,  gallop.   ABDOMEN:  Soft, nontender, bowel sounds active.   MUSCULOSKELETAL:  No edema.   NEUROLOGIC:  Alert and oriented.  Cranial nerves II-XII intact.  Motor strength equal and symmetrical.      DISCHARGE MEDICATIONS:   1.  Aspirin 81 mg p.o. daily.   2.  Atorvastatin 40 mg p.o. daily.        Follow up with Neurology in 4 weeks.      Time spent on the day of discharge was more than 30 minutes.         NURIA JUNE MD             D: 2018   T: 2018   MT: BELEN      Name:     CHIDI AGARWAL   MRN:      [id number]        Account:        [id number]   :      1945           Admit Date:     2018                                  Discharge Date: 2018      Document: [id number]